# Patient Record
Sex: MALE | Race: BLACK OR AFRICAN AMERICAN
[De-identification: names, ages, dates, MRNs, and addresses within clinical notes are randomized per-mention and may not be internally consistent; named-entity substitution may affect disease eponyms.]

---

## 2021-05-20 ENCOUNTER — RECORDS - HEALTHEAST (OUTPATIENT)
Dept: ADMINISTRATIVE | Facility: OTHER | Age: 36
End: 2021-05-20

## 2021-05-20 ENCOUNTER — AMBULATORY - HEALTHEAST (OUTPATIENT)
Dept: LAB | Facility: CLINIC | Age: 36
End: 2021-05-20

## 2021-05-20 ENCOUNTER — VIRTUAL VISIT (OUTPATIENT)
Dept: URGENT CARE | Facility: CLINIC | Age: 36
End: 2021-05-20

## 2021-05-20 DIAGNOSIS — Z20.822 EXPOSURE TO COVID-19 VIRUS: ICD-10-CM

## 2021-05-20 DIAGNOSIS — Z20.822 EXPOSURE TO COVID-19 VIRUS: Primary | ICD-10-CM

## 2021-05-20 PROCEDURE — 99207 PR NO BILLABLE SERVICE THIS VISIT: CPT

## 2021-05-20 NOTE — PROGRESS NOTES
"The patient has been notified of following:     \"This telephone visit will be conducted via a call between you and your physician/provider. We have found that certain health care needs can be provided without the need for a physical exam.  This service lets us provide the care you need with a short phone conversation.  If a prescription is necessary we can send it directly to your pharmacy.  If lab work is needed we can place an order for that and you can then stop by our lab to have the test done at a later time.    Telephone visits are billed at different rates depending on your insurance coverage. During this emergency period, for some insurers they may be billed the same as an in-person visit.  Please reach out to your insurance provider with any questions.    If during the course of the call the physician/provider feels a telephone visit is not appropriate, you will not be charged for this service.\"      Assessment & Plan:       See patient instructions below.  At the end of the encounter, I discussed diagnosis & medications. Discussed red flags for being seen in person at clinic/ER, as well as indications for follow up if no improvement. Patient understood and agreed to plan.     No diagnosis found.      No follow-ups on file.    Phone Call Duration : 10  minutes    Jewel Browning PA-C , [facility]  -----------------------------------------------------------------------------------------------------------------------------------------------------------------    Subjective:   Briseida Davis Deangelo is a 35 year old male who is contacted via telephone through scheduled urgent care virtual visit to discuss: No chief complaint on file.      No treatments tried. Patient reports no fever/chills, headache, chest pain, shortness of breath, abdominal pain, nausea, vomiting, diarrhea, rash, or any other symptoms.     No past medical history on file.      Objective:   Gen:  NAD  Pulm: " non-labored work of breathing    No results found for any visits on 05/20/21.    There are no Patient Instructions on file for this visit.

## 2021-05-21 LAB
SARS-COV-2 PCR COMMENT: NORMAL
SARS-COV-2 RNA SPEC QL NAA+PROBE: NEGATIVE
SARS-COV-2 VIRUS SPECIMEN SOURCE: NORMAL

## 2021-05-22 ENCOUNTER — COMMUNICATION - HEALTHEAST (OUTPATIENT)
Dept: SCHEDULING | Facility: CLINIC | Age: 36
End: 2021-05-22

## 2021-05-24 ENCOUNTER — COMMUNICATION - HEALTHEAST (OUTPATIENT)
Dept: FAMILY MEDICINE | Facility: CLINIC | Age: 36
End: 2021-05-24

## 2021-05-24 ENCOUNTER — OFFICE VISIT - HEALTHEAST (OUTPATIENT)
Dept: FAMILY MEDICINE | Facility: CLINIC | Age: 36
End: 2021-05-24

## 2021-05-24 DIAGNOSIS — E66.01 MORBID OBESITY (H): ICD-10-CM

## 2021-05-24 DIAGNOSIS — Z11.3 SCREEN FOR STD (SEXUALLY TRANSMITTED DISEASE): ICD-10-CM

## 2021-05-24 DIAGNOSIS — I10 ESSENTIAL HYPERTENSION, BENIGN: ICD-10-CM

## 2021-05-24 LAB — HIV 1+2 AB+HIV1 P24 AG SERPL QL IA: NEGATIVE

## 2021-05-24 RX ORDER — AMLODIPINE BESYLATE 5 MG/1
5 TABLET ORAL
Status: SHIPPED | COMMUNITY
Start: 2021-04-16

## 2021-05-24 RX ORDER — PRAZOSIN HYDROCHLORIDE 1 MG/1
1 CAPSULE ORAL AT BEDTIME
Status: SHIPPED | COMMUNITY
Start: 2021-02-03

## 2021-05-24 RX ORDER — HYDROXYZINE PAMOATE 25 MG/1
25 CAPSULE ORAL
Status: SHIPPED | COMMUNITY
Start: 2021-05-24

## 2021-05-24 RX ORDER — MOMETASONE FUROATE AND FORMOTEROL FUMARATE DIHYDRATE 100; 5 UG/1; UG/1
2 AEROSOL RESPIRATORY (INHALATION)
Status: SHIPPED | COMMUNITY
Start: 2021-04-16

## 2021-05-24 RX ORDER — PRAZOSIN HYDROCHLORIDE 2 MG/1
2 CAPSULE ORAL AT BEDTIME
Status: SHIPPED | COMMUNITY
Start: 2021-03-17

## 2021-05-24 RX ORDER — MOMETASONE FUROATE AND FORMOTEROL FUMARATE DIHYDRATE 100; 5 UG/1; UG/1
AEROSOL RESPIRATORY (INHALATION)
Status: SHIPPED | COMMUNITY
Start: 2021-05-17

## 2021-05-24 RX ORDER — CETIRIZINE HYDROCHLORIDE 10 MG/1
10 TABLET ORAL
Status: SHIPPED | COMMUNITY
Start: 2021-04-16

## 2021-05-24 RX ORDER — ACETAMINOPHEN 500 MG
1000 TABLET ORAL
Status: SHIPPED | COMMUNITY
Start: 2021-02-03

## 2021-05-24 RX ORDER — AZITHROMYCIN 500 MG/1
TABLET, FILM COATED ORAL
Qty: 2 TABLET | Refills: 0 | Status: SHIPPED | OUTPATIENT
Start: 2021-05-24

## 2021-05-24 RX ORDER — GABAPENTIN 300 MG/1
900 CAPSULE ORAL
Status: SHIPPED | COMMUNITY
Start: 2021-04-16

## 2021-05-24 RX ORDER — HYDROCORTISONE 2.5 %
CREAM (GRAM) TOPICAL
Status: SHIPPED | COMMUNITY
Start: 2021-02-18

## 2021-05-24 RX ORDER — NAPROXEN 500 MG/1
500 TABLET ORAL
Status: SHIPPED | COMMUNITY
Start: 2021-04-16

## 2021-05-24 RX ORDER — ALBUTEROL SULFATE 90 UG/1
2 AEROSOL, METERED RESPIRATORY (INHALATION) EVERY 4 HOURS PRN
Status: SHIPPED | COMMUNITY
Start: 2021-04-16

## 2021-05-24 RX ORDER — SODIUM CHLORIDE 0.65 %
AEROSOL, SPRAY (ML) NASAL
Status: SHIPPED | COMMUNITY
Start: 2021-03-18

## 2021-05-24 RX ORDER — KETOROLAC TROMETHAMINE 30 MG/ML
30 INJECTION, SOLUTION INTRAMUSCULAR; INTRAVENOUS
Status: SHIPPED | COMMUNITY
Start: 2021-02-19

## 2021-05-24 ASSESSMENT — MIFFLIN-ST. JEOR: SCORE: 2455.13

## 2021-05-25 LAB
C TRACH DNA SPEC QL NAA+PROBE: NEGATIVE
N GONORRHOEA DNA SPEC QL NAA+PROBE: NEGATIVE
SPEC DESCRIPTION: NORMAL
SPECIMEN DESCRIPTION: NORMAL
T PALLIDUM AB SER QL: NEGATIVE

## 2021-05-26 ENCOUNTER — RECORDS - HEALTHEAST (OUTPATIENT)
Dept: ADMINISTRATIVE | Facility: CLINIC | Age: 36
End: 2021-05-26

## 2021-05-27 ENCOUNTER — RECORDS - HEALTHEAST (OUTPATIENT)
Dept: ADMINISTRATIVE | Facility: CLINIC | Age: 36
End: 2021-05-27

## 2021-05-31 ENCOUNTER — RECORDS - HEALTHEAST (OUTPATIENT)
Dept: ADMINISTRATIVE | Facility: CLINIC | Age: 36
End: 2021-05-31

## 2021-06-02 ENCOUNTER — COMMUNICATION - HEALTHEAST (OUTPATIENT)
Dept: FAMILY MEDICINE | Facility: CLINIC | Age: 36
End: 2021-06-02

## 2021-06-16 PROBLEM — G57.81: Status: ACTIVE | Noted: 2018-05-31

## 2021-06-16 PROBLEM — J45.40 MODERATE PERSISTENT ASTHMA: Status: ACTIVE | Noted: 2018-04-23

## 2021-06-16 PROBLEM — E66.01 MORBID OBESITY (H): Status: ACTIVE | Noted: 2021-05-24

## 2021-06-16 PROBLEM — I10 ESSENTIAL HYPERTENSION, BENIGN: Status: ACTIVE | Noted: 2021-05-24

## 2021-06-16 PROBLEM — F43.10 PTSD (POST-TRAUMATIC STRESS DISORDER): Status: ACTIVE | Noted: 2018-05-31
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
## 2021-06-17 NOTE — TELEPHONE ENCOUNTER
Reason for Call:  Request for results:    Name of test or procedure: covid test`    Date of test of procedure: 05/20/21    Location of the test or procedure: [facility]    OK to leave the result message on voice mail or with a family member? Yes    Phone number Patient can be reached at: Home number on file [phone] (home)    Additional comments: pt seen today forgot to ask for his covid test results    Call taken on 5/24/2021 at 1:11 PM by Gia Pimentel

## 2021-06-25 NOTE — TELEPHONE ENCOUNTER
Left message x 1. Please review message thread below and advise the patient as indicated. Please schedule if necessary or indicated in message thread.   ----- Message from Norma Beltran PA-C sent at 5/29/2021  2:40 PM CDT -----  All his STD test results are negative.  If he is going to continue to come to our clinic for care, he should make an office visit with Dr Mcconnell for BP check within the next month.

## 2021-06-26 ENCOUNTER — COMMUNICATION - HEALTHEAST (OUTPATIENT)
Dept: FAMILY MEDICINE | Facility: CLINIC | Age: 36
End: 2021-06-26

## 2021-07-06 VITALS
WEIGHT: 315 LBS | BODY MASS INDEX: 39.17 KG/M2 | HEIGHT: 75 IN | SYSTOLIC BLOOD PRESSURE: 136 MMHG | DIASTOLIC BLOOD PRESSURE: 93 MMHG | HEART RATE: 64 BPM

## 2021-07-07 NOTE — TELEPHONE ENCOUNTER
He needs to have a visit to f/u on his high blood pressure.  I'm not sure where he is going for his regular care.  If we are going to be his primary clinic, make an appt with us.  Thanks.

## 2021-08-03 PROBLEM — S06.9X1A CLOSED HEAD INJURY WITH BRIEF LOSS OF CONSCIOUSNESS (H): Status: RESOLVED | Noted: 2018-04-23 | Resolved: 2021-05-24

## 2024-09-26 ENCOUNTER — TRANSCRIBE ORDERS (OUTPATIENT)
Dept: OTHER | Age: 39
End: 2024-09-26

## 2024-09-26 DIAGNOSIS — R53.81 PHYSICAL DECONDITIONING: Primary | ICD-10-CM
